# Patient Record
Sex: FEMALE | Race: BLACK OR AFRICAN AMERICAN | URBAN - METROPOLITAN AREA
[De-identification: names, ages, dates, MRNs, and addresses within clinical notes are randomized per-mention and may not be internally consistent; named-entity substitution may affect disease eponyms.]

---

## 2017-12-29 ENCOUNTER — OFFICE VISIT (OUTPATIENT)
Dept: OTOLARYNGOLOGY | Facility: CLINIC | Age: 67
End: 2017-12-29

## 2017-12-29 VITALS
SYSTOLIC BLOOD PRESSURE: 160 MMHG | TEMPERATURE: 99 F | WEIGHT: 185 LBS | BODY MASS INDEX: 34.04 KG/M2 | HEIGHT: 62 IN | DIASTOLIC BLOOD PRESSURE: 78 MMHG

## 2017-12-29 DIAGNOSIS — K11.8 SUBMANDIBULAR GLAND MASS: Primary | ICD-10-CM

## 2017-12-29 DIAGNOSIS — R07.0 THROAT DISCOMFORT: ICD-10-CM

## 2017-12-29 PROCEDURE — 99214 OFFICE O/P EST MOD 30 MIN: CPT | Performed by: OTOLARYNGOLOGY

## 2017-12-29 PROCEDURE — 31575 DIAGNOSTIC LARYNGOSCOPY: CPT | Performed by: OTOLARYNGOLOGY

## 2017-12-29 PROCEDURE — 99212 OFFICE O/P EST SF 10 MIN: CPT | Performed by: OTOLARYNGOLOGY

## 2017-12-29 RX ORDER — PREDNISONE 20 MG/1
TABLET ORAL
Refills: 0 | COMMUNITY
Start: 2017-12-13 | End: 2017-12-29 | Stop reason: ALTCHOICE

## 2017-12-29 RX ORDER — FLUTICASONE PROPIONATE 50 MCG
SPRAY, SUSPENSION (ML) NASAL
Refills: 5 | COMMUNITY
Start: 2017-12-19

## 2017-12-29 RX ORDER — LOSARTAN POTASSIUM 100 MG/1
TABLET ORAL
Refills: 2 | COMMUNITY
Start: 2017-12-13

## 2017-12-29 RX ORDER — TIOTROPIUM BROMIDE 18 UG/1
CAPSULE ORAL; RESPIRATORY (INHALATION)
Refills: 2 | COMMUNITY
Start: 2017-12-13

## 2017-12-29 NOTE — PROGRESS NOTES
Leanna Jordan is a 78year old female. 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  No path availab Problem Relation Age of Onset   • Hypertension Mother    • Hypertension Sister        Past Medical History:   Diagnosis Date   • Essential hypertension    • Migraine headache        Past Surgical History:  No date: EXCISION OF SALIVARY GLAND      REVIEW Normal Inspection - Right: Normal, Left: Normal. Canal - Right: Normal, Left: Normal. TM - Right: Normal, Left: Normal.   Skin Normal Inspection - Normal.        Lymph Detail Normal Submental. Submandibular.  Anterior cervical. Posterior cervical. Supraclav Rfl: 2  •  Montelukast Sodium 10 MG Oral Tab, Take 1 tablet (10 mg total) by mouth nightly., Disp: 30 tablet, Rfl: 3  •  loratadine 10 MG Oral Tab, Take 1 tablet (10 mg total) by mouth daily. , Disp: 30 tablet, Rfl: 3  •  Mometasone Furoate (NASONEX) 50 MCG

## 2018-01-08 ENCOUNTER — TELEPHONE (OUTPATIENT)
Dept: OTOLARYNGOLOGY | Facility: CLINIC | Age: 68
End: 2018-01-08

## 2018-01-08 NOTE — TELEPHONE ENCOUNTER
Lea Buchanan from Dr Harris Read office (PCP) called to see if LOV notes can be faxed to [phone]

## 2018-01-10 ENCOUNTER — TELEPHONE (OUTPATIENT)
Dept: OTOLARYNGOLOGY | Facility: CLINIC | Age: 68
End: 2018-01-10

## 2018-01-10 NOTE — TELEPHONE ENCOUNTER
Pt states PCP sent referral to office for pt to have CT scan - pt is calling to find out if it was received

## 2018-01-18 ENCOUNTER — TELEPHONE (OUTPATIENT)
Dept: OTOLARYNGOLOGY | Facility: CLINIC | Age: 68
End: 2018-01-18

## 2018-01-18 NOTE — TELEPHONE ENCOUNTER
Spoke w/ Ana Valle; pt is at facility and needs order for US HEAD/NECK. Order faxed to [facility] [place] ([facility]) [facility], [phone]. Confirmation rec'd.

## 2018-01-18 NOTE — TELEPHONE ENCOUNTER
Pt is at [address] [facility] for an ultra sound sound. Need an order.   Call transferred to the nurse

## 2018-01-25 ENCOUNTER — TELEPHONE (OUTPATIENT)
Dept: OTOLARYNGOLOGY | Facility: CLINIC | Age: 68
End: 2018-01-25

## 2018-01-25 NOTE — TELEPHONE ENCOUNTER
Spoke with pt and informed to bring US and biopsy results to appointment tomorrow, 1-26-18 at [facility]. Pt states she did not have any biopsies, only an ultrasound which she will bring the results for to her appt.

## 2018-01-25 NOTE — TELEPHONE ENCOUNTER
alton from [facility] calling. Missing 2nd page of the order with doctor's signature for ultra sound.   please  Fax # [phone]

## 2018-01-26 ENCOUNTER — OFFICE VISIT (OUTPATIENT)
Dept: OTOLARYNGOLOGY | Facility: CLINIC | Age: 68
End: 2018-01-26

## 2018-01-26 ENCOUNTER — TELEPHONE (OUTPATIENT)
Dept: OTOLARYNGOLOGY | Facility: CLINIC | Age: 68
End: 2018-01-26

## 2018-01-26 VITALS
TEMPERATURE: 99 F | HEIGHT: 62 IN | BODY MASS INDEX: 34.04 KG/M2 | DIASTOLIC BLOOD PRESSURE: 82 MMHG | WEIGHT: 185 LBS | SYSTOLIC BLOOD PRESSURE: 154 MMHG

## 2018-01-26 DIAGNOSIS — R22.0 MASS OF RIGHT SUBMANDIBULAR REGION: Primary | ICD-10-CM

## 2018-01-26 PROCEDURE — 99212 OFFICE O/P EST SF 10 MIN: CPT | Performed by: OTOLARYNGOLOGY

## 2018-01-26 PROCEDURE — 99214 OFFICE O/P EST MOD 30 MIN: CPT | Performed by: OTOLARYNGOLOGY

## 2018-01-26 NOTE — PROGRESS NOTES
Lourdes Stewart is a 80year old female. 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  No path available but she recalls that these submandibular area lateral to the left submandibular gland. She has no new signs, symptoms or complaints.     Social History    Marital status: Single              Spouse name:                       Years of education:                 Number of children: parotid gland -postsurgical changes on the right thyroid gland - Normal.   Eyes Normal Conjunctiva - Right: Normal, Left: Normal. Pupil - Right: Normal, Left: Normal. Fundus - Right: Normal, Left: Normal.   Neurological Normal Memory - Normal. Cranial nerv biopsy of this lesion as she does have a previous history of what appears to be a right parotidectomy for some type of benign tumor. We discussed the possibility of recurrence versus a benign lymph node.   We will await the results of this FNA as this will

## 2018-01-26 NOTE — TELEPHONE ENCOUNTER
Michelle Turcios from pts PCP office, Dr. Fina Shaw called. Requesting clinicals in order to make a referral for the US Biopsy [facility] order today. Fax # [phone]. Thank you.

## 2018-02-08 ENCOUNTER — TELEPHONE (OUTPATIENT)
Dept: OTOLARYNGOLOGY | Facility: CLINIC | Age: 68
End: 2018-02-08

## 2018-02-08 NOTE — TELEPHONE ENCOUNTER
Interventional radiology requesting last OV note and biopsy order, pls fax to [phone]. Thank you.

## 2018-02-08 NOTE — TELEPHONE ENCOUNTER
[facility] calling to f/up on getting the pts last OV note, biospy and order. Tech states that the correct Fax # Should be # [phone].

## 2018-02-20 ENCOUNTER — TELEPHONE (OUTPATIENT)
Dept: OTOLARYNGOLOGY | Facility: CLINIC | Age: 68
End: 2018-02-20

## 2018-02-20 NOTE — TELEPHONE ENCOUNTER
Patient had biopsy done 02/13 at [facility] sub. Requesting to see if Bx received by JDO. Please call. Thank you.

## 2018-02-23 NOTE — TELEPHONE ENCOUNTER
Spoke w/ pt; informed her JDO rec'd biopsy results and he would like her to RTC with copy of CT NECK. Pt states she already scheduled f/u appt for 2/26; she has a disc with the images, but is not sure if she has the CT report.     Faxed [place] to [place]

## 2018-02-26 ENCOUNTER — OFFICE VISIT (OUTPATIENT)
Dept: OTOLARYNGOLOGY | Facility: CLINIC | Age: 68
End: 2018-02-26

## 2018-02-26 VITALS
SYSTOLIC BLOOD PRESSURE: 150 MMHG | BODY MASS INDEX: 34.04 KG/M2 | HEIGHT: 62 IN | WEIGHT: 185 LBS | DIASTOLIC BLOOD PRESSURE: 83 MMHG | TEMPERATURE: 98 F

## 2018-02-26 DIAGNOSIS — K11.8 SUBMANDIBULAR GLAND MASS: Primary | ICD-10-CM

## 2018-02-26 PROCEDURE — 99212 OFFICE O/P EST SF 10 MIN: CPT | Performed by: OTOLARYNGOLOGY

## 2018-02-26 PROCEDURE — 99214 OFFICE O/P EST MOD 30 MIN: CPT | Performed by: OTOLARYNGOLOGY

## 2018-02-26 NOTE — PROGRESS NOTES
Maria Isabel Bee is a 80year old female. 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  No path a and vascular lesion of the right submandibular area lateral to the left submandibular gland. She has no new signs, symptoms or complaints.     2/26/18 last time I saw her we sent her for ultrasound-guided FNA which demonstrated Warthin tumor.   This is con (1.575 m)   Wt 185 lb (83.9 kg)   BMI 33.84 kg/m²        Constitutional Normal Overall appearance - Normal.   Psychiatric Normal Orientation - Oriented to time, place, person & situation. Appropriate mood and affect.    Neck Exam Normal Inspection - Normal. Mometasone Furoate (NASONEX) 50 MCG/ACT Nasal Suspension, 1 spray by Nasal route 2 (two) times daily. , Disp: 1 Bottle, Rfl: 3  ASSESSMENT AND PLAN    1.  Submandibular gland mass  Mass of the right submandibular area with needle biopsy demonstrating probabl

## 2018-04-04 ENCOUNTER — TELEPHONE (OUTPATIENT)
Dept: OTOLARYNGOLOGY | Facility: CLINIC | Age: 68
End: 2018-04-04